# Patient Record
Sex: MALE | Race: WHITE | NOT HISPANIC OR LATINO | URBAN - METROPOLITAN AREA
[De-identification: names, ages, dates, MRNs, and addresses within clinical notes are randomized per-mention and may not be internally consistent; named-entity substitution may affect disease eponyms.]

---

## 2021-05-25 ENCOUNTER — RECORDS - HEALTHEAST (OUTPATIENT)
Dept: ADMINISTRATIVE | Facility: CLINIC | Age: 56
End: 2021-05-25

## 2024-03-07 ENCOUNTER — OFFICE VISIT (OUTPATIENT)
Dept: FAMILY MEDICINE | Facility: CLINIC | Age: 59
End: 2024-03-07

## 2024-03-07 VITALS
WEIGHT: 256 LBS | BODY MASS INDEX: 31.83 KG/M2 | HEART RATE: 55 BPM | OXYGEN SATURATION: 96 % | DIASTOLIC BLOOD PRESSURE: 66 MMHG | HEIGHT: 75 IN | SYSTOLIC BLOOD PRESSURE: 121 MMHG | TEMPERATURE: 97.6 F

## 2024-03-07 DIAGNOSIS — M25.50 MULTIPLE JOINT PAIN: ICD-10-CM

## 2024-03-07 DIAGNOSIS — G47.00 INSOMNIA, UNSPECIFIED TYPE: ICD-10-CM

## 2024-03-07 DIAGNOSIS — Z13.228 SCREENING FOR METABOLIC DISORDER: ICD-10-CM

## 2024-03-07 DIAGNOSIS — Z71.89 ACP (ADVANCE CARE PLANNING): ICD-10-CM

## 2024-03-07 DIAGNOSIS — F41.1 GENERALIZED ANXIETY DISORDER: ICD-10-CM

## 2024-03-07 DIAGNOSIS — J01.41 ACUTE RECURRENT PANSINUSITIS: ICD-10-CM

## 2024-03-07 DIAGNOSIS — M62.830 BACK MUSCLE SPASM: ICD-10-CM

## 2024-03-07 DIAGNOSIS — R68.82 LOW LIBIDO: ICD-10-CM

## 2024-03-07 DIAGNOSIS — Z13.220 SCREENING FOR LIPID DISORDERS: ICD-10-CM

## 2024-03-07 DIAGNOSIS — M48.061 SPINAL STENOSIS OF LUMBAR REGION, UNSPECIFIED WHETHER NEUROGENIC CLAUDICATION PRESENT: ICD-10-CM

## 2024-03-07 DIAGNOSIS — R53.83 DECREASED STAMINA: ICD-10-CM

## 2024-03-07 DIAGNOSIS — Z76.89 HEALTH CARE HOME: ICD-10-CM

## 2024-03-07 DIAGNOSIS — Z00.00 ENCOUNTER FOR GENERAL MEDICAL EXAMINATION: Primary | ICD-10-CM

## 2024-03-07 LAB
ALBUMIN SERPL-MCNC: 4.6 G/DL (ref 3.6–5.1)
ALBUMIN/GLOB SERPL: 1.8 {RATIO} (ref 1–2.5)
ALP SERPL-CCNC: 59 U/L (ref 33–130)
ALT 1742-6: 26 U/L (ref 0–32)
AST 1920-8: 25 U/L (ref 0–35)
BILIRUB SERPL-MCNC: 0.9 MG/DL (ref 0.2–1.2)
BUN SERPL-MCNC: 21 MG/DL (ref 7–25)
BUN/CREATININE RATIO: 21.6 (ref 6–32)
CALCIUM SERPL-MCNC: 9.4 MG/DL (ref 8.6–10.3)
CHLORIDE SERPLBLD-SCNC: 102.9 MMOL/L (ref 98–110)
CHOLEST SERPL-MCNC: 215 MG/DL (ref 0–199)
CHOLEST/HDLC SERPL: 4 {RATIO} (ref 0–5)
CO2 SERPL-SCNC: 28.1 MMOL/L (ref 20–32)
CREAT SERPL-MCNC: 0.97 MG/DL (ref 0.6–1.3)
GLOBULIN, CALCULATED - QUEST: 2.5 (ref 1.9–3.7)
GLUCOSE SERPL-MCNC: 102 MG/DL (ref 60–99)
HDLC SERPL-MCNC: 51 MG/DL (ref 40–150)
LDLC SERPL CALC-MCNC: 143 MG/DL (ref 0–130)
POTASSIUM SERPL-SCNC: 4.35 MMOL/L (ref 3.5–5.3)
PROT SERPL-MCNC: 7.1 G/DL (ref 6.1–8.1)
SODIUM SERPL-SCNC: 138.2 MMOL/L (ref 135–146)
TRIGL SERPL-MCNC: 103 MG/DL (ref 0–149)

## 2024-03-07 PROCEDURE — 84403 ASSAY OF TOTAL TESTOSTERONE: CPT | Mod: 90 | Performed by: PHYSICIAN ASSISTANT

## 2024-03-07 PROCEDURE — 80053 COMPREHEN METABOLIC PANEL: CPT | Performed by: PHYSICIAN ASSISTANT

## 2024-03-07 PROCEDURE — 84402 ASSAY OF FREE TESTOSTERONE: CPT | Mod: 90 | Performed by: PHYSICIAN ASSISTANT

## 2024-03-07 PROCEDURE — 80061 LIPID PANEL: CPT | Performed by: PHYSICIAN ASSISTANT

## 2024-03-07 PROCEDURE — 36415 COLL VENOUS BLD VENIPUNCTURE: CPT | Performed by: PHYSICIAN ASSISTANT

## 2024-03-07 PROCEDURE — 99213 OFFICE O/P EST LOW 20 MIN: CPT | Mod: 25 | Performed by: PHYSICIAN ASSISTANT

## 2024-03-07 PROCEDURE — 99386 PREV VISIT NEW AGE 40-64: CPT | Performed by: PHYSICIAN ASSISTANT

## 2024-03-07 RX ORDER — HYDROXYZINE HYDROCHLORIDE 25 MG/1
25 TABLET, FILM COATED ORAL 3 TIMES DAILY PRN
Qty: 30 TABLET | Refills: 1 | Status: SHIPPED | OUTPATIENT
Start: 2024-03-07

## 2024-03-07 RX ORDER — MELOXICAM 15 MG/1
15 TABLET ORAL DAILY
Qty: 90 TABLET | Refills: 3 | Status: SHIPPED | OUTPATIENT
Start: 2024-03-07

## 2024-03-07 RX ORDER — METHOCARBAMOL 750 MG/1
750 TABLET, FILM COATED ORAL 3 TIMES DAILY PRN
Qty: 30 TABLET | Refills: 2 | Status: SHIPPED | OUTPATIENT
Start: 2024-03-07

## 2024-03-07 RX ORDER — PREDNISONE 20 MG/1
20 TABLET ORAL 2 TIMES DAILY
Qty: 10 TABLET | Refills: 0 | Status: SHIPPED | OUTPATIENT
Start: 2024-03-07 | End: 2024-03-12

## 2024-03-07 RX ORDER — MELOXICAM 10 MG/1
CAPSULE ORAL
COMMUNITY
End: 2024-03-07

## 2024-03-07 RX ORDER — ESCITALOPRAM OXALATE 5 MG/1
5 TABLET ORAL DAILY
Qty: 30 TABLET | Refills: 1 | Status: SHIPPED | OUTPATIENT
Start: 2024-03-07

## 2024-03-07 RX ORDER — TRAZODONE HYDROCHLORIDE 100 MG/1
100 TABLET ORAL AT BEDTIME
COMMUNITY
End: 2024-03-09

## 2024-03-07 RX ORDER — MELOXICAM 15 MG/1
15 TABLET ORAL DAILY
COMMUNITY
End: 2024-03-07

## 2024-03-07 RX ORDER — LORATADINE 10 MG/1
10 TABLET ORAL DAILY
COMMUNITY

## 2024-03-07 RX ORDER — FLUTICASONE PROPIONATE 50 MCG
1 SPRAY, SUSPENSION (ML) NASAL DAILY
COMMUNITY

## 2024-03-07 RX ORDER — METHOCARBAMOL 750 MG/1
750 TABLET, FILM COATED ORAL 4 TIMES DAILY PRN
COMMUNITY
End: 2024-03-07
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
## 2024-03-07 NOTE — PROGRESS NOTES
Paresh Pathak is a 58 year old male presents for routine health maintenance.    Current concerns: Establish Care. Medication Refills. Several concerns outside the scope of a physical. See separate note.      Body mass index is 32 kg/m .    Present exercise habits:  Active.  Present dietary habits:  eats regular meals and follows a balanced nutrition diet    Vit D intake: is not taking supplement    Is the patient a smoker? No  If yes, smoking cessation advised and counseling provided.     Cardiovascular risk factors: obesity    Over the past few weeks, have you felt down or depressed? Little interest or pleasure in doing things? More so anxiety, occasional low mood, depression. See separate note.     Last dental appointment:  last year  Last optical appointment:  last year    Was the patient born between [birth year] and has not had Hep C testing?  No, not applicable    I have reviewed the following histories: Past Medical History, Past Surgical History, Social History, Family History, Problem List, Medication List and Allergies    No past medical history on file.  Family History   Problem Relation Age of Onset    Diabetes Type 2  Mother     Substance Abuse Mother     Coronary Artery Disease Father         multiple stents    Myocardial Infarction Father     Diabetes Maternal Half-Sister     Heart Disease Maternal Half-Sister     Substance Abuse Maternal Half-Sister     Substance Abuse Maternal Half-Brother     Arrhythmia Maternal Half-Brother         defibrillatior    Heart Disease Maternal Half-Brother         waiting for transplant    Diabetes Type 2  Maternal Half-Brother     Colon Cancer No family hx of     Prostate Cancer No family hx of      Social History     Socioeconomic History    Marital status:      Spouse name: Not on file    Number of children: Not on file    Years of education: Not on file    Highest education level: Not on file   Occupational History    Not on file   Tobacco Use    Smoking status:  "Never    Smokeless tobacco: Never   Substance and Sexual Activity    Alcohol use: Not Currently     Comment: 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    Drug use: Not Currently     Types: Methamphetamines, Cocaine, Marijuana    Sexual activity: Yes     Partners: Female     Birth control/protection: Post-menopausal   Other Topics Concern    Not on file   Social History Narrative    Not on file     Social Determinants of Health     Financial Resource Strain: Not on file   Food Insecurity: Not on file   Transportation Needs: Not on file   Physical Activity: Not on file   Stress: Not on file   Social Connections: Not on file   Interpersonal Safety: Not on file   Housing Stability: Not on file     Patient Active Problem List   Diagnosis    ACP (advance care planning)    Health Care Home     Current Outpatient Medications   Medication    amoxicillin-clavulanate (AUGMENTIN) 875-125 MG tablet    escitalopram (LEXAPRO) 5 MG tablet    fluticasone (FLONASE) 50 MCG/ACT nasal spray    hydrOXYzine HCl (ATARAX) 25 MG tablet    loratadine (CLARITIN) 10 MG tablet    meloxicam (MOBIC) 15 MG tablet    methocarbamol (ROBAXIN) 750 MG tablet    predniSONE (DELTASONE) 20 MG tablet    traZODone (DESYREL) 100 MG tablet     No current facility-administered medications for this visit.       Allergies:  Not on File      ROS:  E/M: NEGATIVE for ear, nose, mouth and throat problems  R: NEGATIVE for significant/chronic cough or SOB  CV: NEGATIVE for chest pain or palpitations  GI: NEGATIVE for abdominal pain, chronic diarrhea or constipation  : NEGATIVE for dysuria, hematuria, weakened urinary stream      OBJECTIVE:    Vitals:    03/07/24 1053   BP: 121/66   BP Location: Left arm   Patient Position: Sitting   Cuff Size: Adult Large   Pulse: 55   Temp: 97.6  F (36.4  C)   TempSrc: Temporal   SpO2: 96%   Weight: 116.1 kg (256 lb)   Height: 1.905 m (6' 3\")       General: 58 year old male who appears his stated age. Vital signs noted.  Head: " Normocephalic  Eyes: pupils equal round reactive to light and accomodation, extra ocular movements intact  Ears: external canals and tms free of abnormalities  Nose: patent, without mucosal abnormalities  Mouth and throat: without erythema or lesions of the mucosa  Neck: supple, without adenopathy or thyromegaly  Lungs: clear to auscultation, no wheezing or crackles  Cv: regular rate and rhythm, normal s1 and s2 without murmur or click  Abd: soft, non-tender, no masses, no hepatomegaly or splenomegaly.  Gu: normal external genitalia, no hernia  Rectal: No concerns. Did not examine   Ms: normal muscle tone & symmetry  Skin: Clear to inspection  Neuro: sensation and motor function grossly intact; cranial nerves without obvious abnormalities.      ASSESSMENT/PLAN:    Encounter for general medical examination  Paresh is doing relatively well today. Will update fasting labs, and send letter with results. [facility], so will attempt to get records from that. Up to date on colonoscopy per pt, but we don't have record of this.     Screening for metabolic disorder  - VENOUS COLLECTION  - Comprehensive Metobolic Panel (BFP)    Screening for lipid disorders  - VENOUS COLLECTION  - Lipid Panel (BFP)    Generalized anxiety disorder  - escitalopram (LEXAPRO) 5 MG tablet; Take 1 tablet (5 mg) by mouth daily  - hydrOXYzine HCl (ATARAX) 25 MG tablet; Take 1 tablet (25 mg) by mouth 3 times daily as needed for anxiety    Acute recurrent pansinusitis  - amoxicillin-clavulanate (AUGMENTIN) 875-125 MG tablet; Take 1 tablet by mouth 2 times daily for 10 days  - predniSONE (DELTASONE) 20 MG tablet; Take 1 tablet (20 mg) by mouth 2 times daily for 5 days    Back muscle spasm  - methocarbamol (ROBAXIN) 750 MG tablet; Take 1 tablet (750 mg) by mouth 3 times daily as needed for muscle spasms    Multiple joint pain  - meloxicam (MOBIC) 15 MG tablet; Take 1 tablet (15 mg) by mouth daily    Spinal stenosis of  "lumbar region, unspecified whether neurogenic claudication present    Decreased stamina  Low libido  - Testosterone Free and Total ([facility])    ACP (advance care planning)    Health Care Home    Insomnia, unspecified type  - traZODone (DESYREL) 100 MG tablet; Take 1 tablet (100 mg) by mouth at bedtime     reports that he has never smoked. He has never used smokeless tobacco.      Estimated body mass index is 32 kg/m  as calculated from the following:    Height as of this encounter: 1.905 m (6' 3\").    Weight as of this encounter: 116.1 kg (256 lb).  Weight management plan: Discussed healthy diet and exercise guidelines      Labs pending:      Fasting glucose      Fasting lipids  Meds Suggested:      Vitamin D       Calcium  Tests Recommended:      Regular Dental Examinations        Eye exam  Behavior Modifications:       Cardiovascular exercise 3 times per week--enough to get your Target Heart rate  Other recommendations:     BMI noted and discussed      Regular breast exam     Encouraged My Chart    The patient will return to the clinic if symptoms are changing or concern with follow up as discussed. The patient understands and agrees with the plan.      Antonia Rahman PA-C  3/7/2024      Counseling Resources:  ATP IV Guidelines  Pooled Cohorts Equation Calculator  Breast Cancer Risk Calculator  FRAX Risk Assessment  ICSI Preventive Guidelines  Dietary Guidelines for Americans, 2010  [employer] MyPlate    "

## 2024-03-07 NOTE — NURSING NOTE
Pre-visit Screening:  Immunizations:  unknown-[facility] a [facility]. Gave pt a RANDOLPH.  Colonoscopy:  [facility] ago  Mammogram: na  Asthma Action Test/Plan:  na  PHQ9:  given today  GAD7:  given today  Questioned patient about current smoking habits Pt. has never smoked.  Ok to leave detailed message on voice mail for today's visit only yes, phone #  [phone]

## 2024-03-07 NOTE — LETTER
March 12, 2024      Paresh Pathak  [address] [place] [address]        Dear Paresh,    Your comprehensive metabolic panel (CMP), which looks at your liver function, kidney function, electrolyte levels, and fasting glucose (blood sugar) level is within normal limits, other than glucose was mildly elevated.     Your lipid/cholesterol profile showed total and LDL (bad) cholesterol were mildly elevated. We should recheck in 1-2 years. The goal for your total cholesterol is <200. The goal for your HDL (good) cholesterol, which is often an indication of exercise, is >40, but is even better when it is >50. The goal for your LDL (bad) cholesterol is <130, but is even better if <100. Finally, the goal for your triglycerides, which are usually a reflection of the fat content in your diet, is <150.     Your total and free testosterone were in the normal range, so I typically wouldn't recommend referral to endocrinology at this point.    [facility], but received notice back that they don't have record of you in their system. This is likely an error, but if you are able to contact them and have records send to us at fax # [phone] that would be helpful moving forward.       Resulted Orders   Comprehensive Metobolic Panel (BFP)   Result Value Ref Range    Carbon Dioxide 28.1 20 - 32 mmol/L    Creatinine 0.97 0.60 - 1.30 mg/dL    Glucose 102 (A) 60 - 99 mg/dL    Sodium 138.2 135 - 146 mmol/L    Potassium 4.35 3.5 - 5.3 mmol/L    Chloride 102.9 98 - 110 mmol/L    Protein Total 7.1 6.1 - 8.1 g/dL    Albumin 4.6 3.6 - 5.1 g/dL    Alkaline Phosphatase 59 33 - 130 U/L    ALT 26 0 - 32 U/L    AST 25 0 - 35 U/L    Bilirubin Total 0.9 0.2 - 1.2 mg/dL    Urea Nitrogen 21 7 - 25 mg/dL    Calcium 9.4 8.6 - 10.3 mg/dL    BUN/Creatinine Ratio 21.6 6 - 32    Globulin Calculated 2.5 1.9 - 3.7    A/G Ratio 1.8 1 - 2.5   Lipid Panel (BFP)   Result Value Ref Range    Cholesterol 215 (A) 0 - 199 mg/dL     Triglycerides 103 0 - 149 mg/dL    HDL Cholesterol 51 40 - 150 mg/dL    LDL Cholesterol Direct 143 (A) 0 - 130 mg/dL    Cholesterol/HDL Ratio 4 0 - 5   Testosterone Free and Total ([facility])   Result Value Ref Range    Testosterone, Total, LC/MS/ 250 - 1,100 ng/dL      Comment:      Men with clinically significant hypogonadal symptoms and testosterone values  repeatedly in the range of the 200-300 ng/dL or less, may benefit from  testosterone treatment after adequate risk and benefits counseling.     For additional information, please refer to  https://education.[employer]/faq/VAM740  (This link is being provided for informational/educational purposes only.)  (Note)     This test was developed and its analytical performance   characteristics have been determined by [employer]. It has   not been cleared or approved by the FDA. This assay has   been validated pursuant to the CLIA regulations and is   used for clinical purposes.            Testosterone Free 45.1 35.0 - 155.0 pg/mL      Comment:      (Note)  This test was developed and its analytical performance   characteristics have been determined by [employer]. It has   not been cleared or approved by the FDA. This assay has   been validated pursuant to the CLIA regulations and is   used for clinical purposes.     MDF  [facility]  [address]  [place] [address] [phone]  Wale Lopez MD       If you have any questions or concerns, please call the clinic at the number listed above.     Sincerely,      Antonia Rahman PA-C

## 2024-03-08 ASSESSMENT — PATIENT HEALTH QUESTIONNAIRE - PHQ9
SUM OF ALL RESPONSES TO PHQ QUESTIONS 1-9: 5
5. POOR APPETITE OR OVEREATING: SEVERAL DAYS

## 2024-03-08 ASSESSMENT — ANXIETY QUESTIONNAIRES
GAD7 TOTAL SCORE: 5
GAD7 TOTAL SCORE: 5
1. FEELING NERVOUS, ANXIOUS, OR ON EDGE: MORE THAN HALF THE DAYS
7. FEELING AFRAID AS IF SOMETHING AWFUL MIGHT HAPPEN: NOT AT ALL
IF YOU CHECKED OFF ANY PROBLEMS ON THIS QUESTIONNAIRE, HOW DIFFICULT HAVE THESE PROBLEMS MADE IT FOR YOU TO DO YOUR WORK, TAKE CARE OF THINGS AT HOME, OR GET ALONG WITH OTHER PEOPLE: SOMEWHAT DIFFICULT
2. NOT BEING ABLE TO STOP OR CONTROL WORRYING: SEVERAL DAYS
3. WORRYING TOO MUCH ABOUT DIFFERENT THINGS: NOT AT ALL
6. BECOMING EASILY ANNOYED OR IRRITABLE: SEVERAL DAYS
5. BEING SO RESTLESS THAT IT IS HARD TO SIT STILL: NOT AT ALL

## 2024-03-09 RX ORDER — TRAZODONE HYDROCHLORIDE 100 MG/1
100 TABLET ORAL AT BEDTIME
Qty: 90 TABLET | Refills: 3 | Status: SHIPPED | OUTPATIENT
Start: 2024-03-09

## 2024-03-11 ENCOUNTER — DOCUMENTATION ONLY (OUTPATIENT)
Dept: FAMILY MEDICINE | Facility: CLINIC | Age: 59
End: 2024-03-11

## 2024-03-11 LAB
TESTOSTERONE FREE LC/MS/MS - QUEST: 45.1 PG/ML (ref 35–155)
TESTOSTERONE, TOTAL, LC/MS/MS-QUEST: 389 NG/DL (ref 250–1100)

## 2024-03-19 ENCOUNTER — TRANSFERRED RECORDS (OUTPATIENT)
Dept: FAMILY MEDICINE | Facility: CLINIC | Age: 59
End: 2024-03-19

## 2024-05-08 DIAGNOSIS — F41.1 GENERALIZED ANXIETY DISORDER: ICD-10-CM

## 2024-05-08 RX ORDER — ESCITALOPRAM OXALATE 5 MG/1
5 TABLET ORAL DAILY
COMMUNITY
Start: 2024-05-08

## 2024-05-08 NOTE — TELEPHONE ENCOUNTER
Paresh Pathak is requesting a refill of:    Refused Prescriptions:                       Disp   Refills    escitalopram (LEXAPRO) 5 MG tablet [[facility]*                Sig: TAKE 1 TABLET BY MOUTH EVERY DAY  Refused By: DEVI SILVA  Reason for Refusal: Patient needs appointment    Needs OV for refills

## 2024-06-17 PROBLEM — Z76.89 HEALTH CARE HOME: Status: RESOLVED | Noted: 2024-03-07 | Resolved: 2024-06-17

## 2024-12-11 ENCOUNTER — TRANSFERRED RECORDS (OUTPATIENT)
Dept: FAMILY MEDICINE | Facility: CLINIC | Age: 59
End: 2024-12-11

## 2025-03-01 DIAGNOSIS — M25.50 MULTIPLE JOINT PAIN: ICD-10-CM

## 2025-03-03 RX ORDER — MELOXICAM 15 MG/1
15 TABLET ORAL DAILY
COMMUNITY
Start: 2025-03-03

## 2025-03-03 NOTE — TELEPHONE ENCOUNTER
Refused Prescriptions:                       Disp   Refills    meloxicam (MOBIC) 15 MG tablet [Pharmacy M*                Sig: TAKE 1 TABLET BY MOUTH EVERY DAY  Refused By: TEAGAN THRASHER  Reason for Refusal: Patient needs appointment     Pt is due for a yearly fasting PX-last one on 3-7-24